# Patient Record
(demographics unavailable — no encounter records)

---

## 2025-05-21 NOTE — ASSESSMENT
[FreeTextEntry1] : 82F with history of recurrent UTIs and GSM.   exam shows significant atrophic vaginitis. History of gross hematuria, SAE and rectal cancer.   Straight catheter urine specimen collected, see note.  -Previous labs reviewed -UA/Ucx today -CBC, CMP to assess for kidney function and history of anemia  -Discussed with patient's PCP Dr. Carlin. CT Urogram with oral contrast to also assess patient's bowels given rectal CA hx  -Cranberry supplements w/ >36 mg PAC and D-mannose for UTI prevention  -Start Premarin. Prescribed topical Estrogen cream - Discussed application of a pea-sized amount to the area of the introitus three times per week.  Discussed that topical estrogen use does not pose the risk that systemic hormone therapy poses. Pt has no personal history of breast, ovarian, or uterine malignancies. Pt understands and wishes to proceed with therapy. -RTC in 3 months   I had an extensive discussion with the patient regarding hematuria. We discussed the 2025 AUA Microscopic Hematuria Guidelines, which stratify patients into low-, intermediate-, and high-risk based on factors such as # of RBCs on microscopic analysis, age, gender, smoking history, and additional urothelial cancer risk factors. For low-risk patients, repeat UA in 6 months is recommended. For intermediate-risk patients, renal US +/- cysto is recommended. For high-risk patients, CT Urogram + cysto are recommended. We discussed evaluation for non-malignant or gynecologic sources of the microscopic hematuria which includes, but is not limited to, kidney/bladder stones, UTIs, vaginal atrophy, and medical renal disease. Per guidelines, patients w/ microscopic hematuria who are on antiplatelet agents or anticoagulant medication should still be evaluated in the same manner as those who are not on such medications. Risk and benefits of evaluation were discussed. All of the patient's questions and concerns were addressed. Based on the 2025 AUA guidelines, this patient falls under HIGH risk.  -Care plan discussed w/ her PCP Dr. Vladimir Greer MD Chief of Urology, [address], [address] [place] [address] Phone: [phone] Fax: [phone]

## 2025-05-21 NOTE — REVIEW OF SYSTEMS
[see HPI] : see HPI [Negative] : Heme/Lymph [Urine Infection (bladder/kidney)] : bladder/kidney infection [History of kidney stones] : history of kidney stones [Wake up at night to urinate  How many times?  ___] : wakes up to urinate [unfilled] times during the night [Bladder pressure] : experiences bladder pressure [Leakage of urine with urgency] : leakage of urine with urgency [Leakage of urine with straining, coughing, laughing] : leakage of urine with straining, coughing, laughing [Unaware of when urine is leaking] : unaware of when urine is leaking

## 2025-05-21 NOTE — PHYSICAL EXAM
[Normal Appearance] : normal appearance [Well Groomed] : well groomed [General Appearance - In No Acute Distress] : no acute distress [Edema] : no peripheral edema [Respiration, Rhythm And Depth] : normal respiratory rhythm and effort [Exaggerated Use Of Accessory Muscles For Inspiration] : no accessory muscle use [Abdomen Soft] : soft [Abdomen Tenderness] : non-tender [Costovertebral Angle Tenderness] : no ~M costovertebral angle tenderness [Urinary Bladder Findings] : the bladder was normal on palpation [Normal Station and Gait] : the gait and station were normal for the patient's age [] : no rash [No Focal Deficits] : no focal deficits [Oriented To Time, Place, And Person] : oriented to person, place, and time [Affect] : the affect was normal [Mood] : the mood was normal [General Appearance - Well Developed] : well developed [General Appearance - Well Nourished] : well nourished [Skin Color & Pigmentation] : normal skin color and pigmentation [Skin Turgor] : supple [de-identified] : +Urethral caruncle. Significant erythema and atrophic vaginitis, unable to introduce speculum exam.  No obvious masses or lesions noted.  No vaginal discharge. [FreeTextEntry2] : Ana Diana NP

## 2025-05-21 NOTE — ASSESSMENT
[FreeTextEntry1] : 82F with history of recurrent UTIs and GSM.   exam shows significant atrophic vaginitis. History of gross hematuria, SAE and rectal cancer.   Straight catheter urine specimen collected, see note.  -Previous labs reviewed -UA/Ucx today -CBC, CMP to assess for kidney function and history of anemia  -Discussed with patient's PCP Dr. Carlin. CT Urogram with oral contrast to also assess patient's bowels given rectal CA hx  -Cranberry supplements w/ >36 mg PAC and D-mannose for UTI prevention  -Start Premarin. Prescribed topical Estrogen cream - Discussed application of a pea-sized amount to the area of the introitus three times per week.  Discussed that topical estrogen use does not pose the risk that systemic hormone therapy poses. Pt has no personal history of breast, ovarian, or uterine malignancies. Pt understands and wishes to proceed with therapy. -RTC in 3 months   I had an extensive discussion with the patient regarding hematuria. We discussed the 2025 AUA Microscopic Hematuria Guidelines, which stratify patients into low-, intermediate-, and high-risk based on factors such as # of RBCs on microscopic analysis, age, gender, smoking history, and additional urothelial cancer risk factors. For low-risk patients, repeat UA in 6 months is recommended. For intermediate-risk patients, renal US +/- cysto is recommended. For high-risk patients, CT Urogram + cysto are recommended. We discussed evaluation for non-malignant or gynecologic sources of the microscopic hematuria which includes, but is not limited to, kidney/bladder stones, UTIs, vaginal atrophy, and medical renal disease. Per guidelines, patients w/ microscopic hematuria who are on antiplatelet agents or anticoagulant medication should still be evaluated in the same manner as those who are not on such medications. Risk and benefits of evaluation were discussed. All of the patient's questions and concerns were addressed. Based on the 2025 AUA guidelines, this patient falls under HIGH risk.  -Care plan discussed w/ her PCP Dr. Vladimir Greer MD Chief of Urology, [address], [address] [place] [address] Phone: [phone] Fax: [phone]

## 2025-05-21 NOTE — HISTORY OF PRESENT ILLNESS
[FreeTextEntry1] : Referring Provider: Self Chief Complaint: recurrent UTI  Date of first visit: 05/21/2025   BRAD HIGGINS is a 82 year old  woman with a PMHx of breast CA, rectal CA, SAE, anemia, cataract, GERD, HLD, lumbar radiculopathy, lumbar spondylosis, Lewy body dementia, osteomyelitis who presents with daughter for evaluation of UTI symptoms. Patient reports cloudy urine x 1 month that progressed to suprapubic pain and pressure over the last week. History of recurrent UTI q every other month per patient. Seen another urologist who prescribed her oral vancomycin for most recent April 2025 UTI.  Does report one episode of gross hematuria in February 2025 that had self-resolved.  Daughter reports recent SAE with Cr 1.9 and Hgb 9.0. However, most recent bloodwork showed improved Cr of 1.43. Asymptomatic of UTI symptoms today. Hx of UUI that has since improved per patient. Drinks 1 cup of coffee daily. Nocturia x 3. Of note, she recently saw GYN and reported difficulty tolerating pelvic exam due to pain. Denies constipation, history of diarrhea.   PMHx: breast CA, rectal CA, cataract, GERD, HLD, lumbar radiculopathy, lumbar spondylosis, Lewy body dementia, osteomyelitis SxHx: Bilateral mastectomy, hysterectomy, carpal tunnel surgery, cataract extraction, lumbar vertebral fusion, lymph node surgery, rectal surgery, tubal ligation FHx: Mother: Breast CA, Brother: Prostate CA SocHx: Former smoker Allergies: Cephalosporins, PCN, clindamycin, Macrobid, doxycycline, adhesive tape, seafood   The patient denies fevers, chills, nausea and or vomiting and no unexplained weight loss. All pertinent laboratory, films and physician notes were reviewed.

## 2025-05-21 NOTE — PHYSICAL EXAM
[Normal Appearance] : normal appearance [Well Groomed] : well groomed [General Appearance - In No Acute Distress] : no acute distress [Edema] : no peripheral edema [Respiration, Rhythm And Depth] : normal respiratory rhythm and effort [Exaggerated Use Of Accessory Muscles For Inspiration] : no accessory muscle use [Abdomen Soft] : soft [Abdomen Tenderness] : non-tender [Costovertebral Angle Tenderness] : no ~M costovertebral angle tenderness [Urinary Bladder Findings] : the bladder was normal on palpation [Normal Station and Gait] : the gait and station were normal for the patient's age [] : no rash [No Focal Deficits] : no focal deficits [Oriented To Time, Place, And Person] : oriented to person, place, and time [Affect] : the affect was normal [Mood] : the mood was normal [General Appearance - Well Developed] : well developed [General Appearance - Well Nourished] : well nourished [Skin Color & Pigmentation] : normal skin color and pigmentation [Skin Turgor] : supple [de-identified] : +Urethral caruncle. Significant erythema and atrophic vaginitis, unable to introduce speculum exam.  No obvious masses or lesions noted.  No vaginal discharge. [FreeTextEntry2] : Ana Diana NP

## 2025-06-19 NOTE — HISTORY OF PRESENT ILLNESS
[de-identified] : Maya Mcguire is a 83 y/o female presents for a follow up visit. Last seen 2021  Patient present with a new left shoulder lump notice about a month   Patient was seen by dermatology on 12/8/2020 and underwent a shave biopsy of a left lateral neck lesion.  Pathology revealed psoriasiform dermatitis.   She states the area healed however now is raised, red, scaly and itchy.  She is concerned and would like to have it looked at.   She is s/p excision of an abdominal wall mass on 2/28/16- path revealed epidermal inclusion cyst.   She was seen in March 2020 with a complaint of a tender, palpable left axillary mass which she felt this weekend.  Denies palpable chest wall masses or pain.  I referred her for a breast MRI which was unrevealing, and showed intact bilateral implants.  Denies personal history of skin cancer or melanoma. She does have a history of left breast cancer in 1991 s/p lumpectomy and then recurrent left breast cancer in 2001 s/p bilateral mastectomies with implant reconstruction (implant exchange in 2015), rectal cancer in 1998.   Diagnosed with DM2 in 2016, currently on Metformin.  Recently being treated for UTI  Recent spider bite of her left elbow, finished abx course of Levaquin. After 2 days of finishing antibiotics, elbow is swollen and red.

## 2025-06-19 NOTE — HISTORY OF PRESENT ILLNESS
[de-identified] : Maya Mcguire is a 81 y/o female presents for a follow up visit. Last seen 2021  Patient present with a new left shoulder lump notice about a month   Patient was seen by dermatology on 12/8/2020 and underwent a shave biopsy of a left lateral neck lesion.  Pathology revealed psoriasiform dermatitis.   She states the area healed however now is raised, red, scaly and itchy.  She is concerned and would like to have it looked at.   She is s/p excision of an abdominal wall mass on 2/28/16- path revealed epidermal inclusion cyst.   She was seen in March 2020 with a complaint of a tender, palpable left axillary mass which she felt this weekend.  Denies palpable chest wall masses or pain.  I referred her for a breast MRI which was unrevealing, and showed intact bilateral implants.  Denies personal history of skin cancer or melanoma. She does have a history of left breast cancer in 1991 s/p lumpectomy and then recurrent left breast cancer in 2001 s/p bilateral mastectomies with implant reconstruction (implant exchange in 2015), rectal cancer in 1998.   Diagnosed with DM2 in 2016, currently on Metformin.  Recently being treated for UTI  Recent spider bite of her left elbow, finished abx course of Levaquin. After 2 days of finishing antibiotics, elbow is swollen and red.